# Patient Record
Sex: MALE | Race: WHITE
[De-identification: names, ages, dates, MRNs, and addresses within clinical notes are randomized per-mention and may not be internally consistent; named-entity substitution may affect disease eponyms.]

---

## 2018-04-12 ENCOUNTER — HOSPITAL ENCOUNTER (OUTPATIENT)
Age: 63
End: 2018-04-12
Payer: COMMERCIAL

## 2018-04-12 DIAGNOSIS — Z12.2: ICD-10-CM

## 2018-04-12 DIAGNOSIS — Z87.891: Primary | ICD-10-CM

## 2019-04-18 ENCOUNTER — HOSPITAL ENCOUNTER (OUTPATIENT)
Age: 64
End: 2019-04-18
Payer: MEDICARE

## 2019-04-18 DIAGNOSIS — Z12.2: Primary | ICD-10-CM

## 2019-04-18 DIAGNOSIS — Z87.891: ICD-10-CM

## 2019-05-03 ENCOUNTER — ON CAMPUS - OUTPATIENT (OUTPATIENT)
Dept: RURAL HOSPITAL 6 | Facility: HOSPITAL | Age: 64
End: 2019-05-03

## 2019-05-03 DIAGNOSIS — D12.3 BENIGN NEOPLASM OF TRANSVERSE COLON: ICD-10-CM

## 2019-05-03 DIAGNOSIS — D12.2 BENIGN NEOPLASM OF ASCENDING COLON: ICD-10-CM

## 2019-05-03 DIAGNOSIS — D12.5 BENIGN NEOPLASM OF SIGMOID COLON: ICD-10-CM

## 2019-05-03 DIAGNOSIS — D12.4 BENIGN NEOPLASM OF DESCENDING COLON: ICD-10-CM

## 2019-05-03 DIAGNOSIS — K64.0 FIRST DEGREE HEMORRHOIDS: ICD-10-CM

## 2019-05-03 DIAGNOSIS — K57.90 DIVERTICULOSIS OF INTESTINE, PART UNSPECIFIED, WITHOUT PERFO: ICD-10-CM

## 2019-05-03 DIAGNOSIS — Z12.11 ENCOUNTER FOR SCREENING FOR MALIGNANT NEOPLASM OF COLON: ICD-10-CM

## 2019-05-03 PROCEDURE — 45385 COLONOSCOPY W/LESION REMOVAL: CPT | Mod: PT | Performed by: INTERNAL MEDICINE

## 2021-10-26 ENCOUNTER — HOSPITAL ENCOUNTER (OUTPATIENT)
Age: 66
End: 2021-10-26
Payer: MEDICARE

## 2021-10-26 DIAGNOSIS — Z11.52: ICD-10-CM

## 2021-10-26 DIAGNOSIS — Z12.11: ICD-10-CM

## 2021-10-26 DIAGNOSIS — Z01.812: Primary | ICD-10-CM

## 2021-10-26 PROCEDURE — C9803 HOPD COVID-19 SPEC COLLECT: HCPCS

## 2021-10-26 PROCEDURE — U0005 INFEC AGEN DETEC AMPLI PROBE: HCPCS

## 2021-10-26 PROCEDURE — U0003 INFECTIOUS AGENT DETECTION BY NUCLEIC ACID (DNA OR RNA); SEVERE ACUTE RESPIRATORY SYNDROME CORONAVIRUS 2 (SARS-COV-2) (CORONAVIRUS DISEASE [COVID-19]), AMPLIFIED PROBE TECHNIQUE, MAKING USE OF HIGH THROUGHPUT TECHNOLOGIES AS DESCRIBED BY CMS-2020-01-R: HCPCS

## 2021-10-28 ENCOUNTER — HOSPITAL ENCOUNTER (OUTPATIENT)
Dept: HOSPITAL 22 - OUTP | Age: 66
Discharge: HOME | End: 2021-10-28
Payer: MEDICARE

## 2021-10-28 VITALS
OXYGEN SATURATION: 97 % | RESPIRATION RATE: 18 BRPM | HEART RATE: 65 BPM | TEMPERATURE: 97.4 F | SYSTOLIC BLOOD PRESSURE: 154 MMHG | DIASTOLIC BLOOD PRESSURE: 84 MMHG

## 2021-10-28 VITALS
HEART RATE: 57 BPM | DIASTOLIC BLOOD PRESSURE: 72 MMHG | RESPIRATION RATE: 18 BRPM | SYSTOLIC BLOOD PRESSURE: 111 MMHG | OXYGEN SATURATION: 95 %

## 2021-10-28 VITALS
SYSTOLIC BLOOD PRESSURE: 110 MMHG | DIASTOLIC BLOOD PRESSURE: 72 MMHG | OXYGEN SATURATION: 97 % | RESPIRATION RATE: 18 BRPM | HEART RATE: 57 BPM

## 2021-10-28 VITALS
DIASTOLIC BLOOD PRESSURE: 81 MMHG | OXYGEN SATURATION: 94 % | SYSTOLIC BLOOD PRESSURE: 115 MMHG | HEART RATE: 58 BPM | RESPIRATION RATE: 18 BRPM

## 2021-10-28 VITALS — OXYGEN SATURATION: 97 %

## 2021-10-28 VITALS
OXYGEN SATURATION: 94 % | SYSTOLIC BLOOD PRESSURE: 129 MMHG | DIASTOLIC BLOOD PRESSURE: 80 MMHG | HEART RATE: 62 BPM | RESPIRATION RATE: 18 BRPM

## 2021-10-28 VITALS
SYSTOLIC BLOOD PRESSURE: 118 MMHG | HEART RATE: 65 BPM | RESPIRATION RATE: 18 BRPM | OXYGEN SATURATION: 95 % | DIASTOLIC BLOOD PRESSURE: 74 MMHG

## 2021-10-28 VITALS
OXYGEN SATURATION: 93 % | SYSTOLIC BLOOD PRESSURE: 88 MMHG | DIASTOLIC BLOOD PRESSURE: 58 MMHG | HEART RATE: 67 BPM | RESPIRATION RATE: 18 BRPM | TEMPERATURE: 96.98 F

## 2021-10-28 VITALS
SYSTOLIC BLOOD PRESSURE: 125 MMHG | OXYGEN SATURATION: 95 % | RESPIRATION RATE: 18 BRPM | DIASTOLIC BLOOD PRESSURE: 63 MMHG | HEART RATE: 58 BPM

## 2021-10-28 VITALS — BODY MASS INDEX: 27.1 KG/M2

## 2021-10-28 DIAGNOSIS — K58.9: ICD-10-CM

## 2021-10-28 DIAGNOSIS — E78.5: ICD-10-CM

## 2021-10-28 DIAGNOSIS — K63.5: ICD-10-CM

## 2021-10-28 DIAGNOSIS — K56.2: ICD-10-CM

## 2021-10-28 DIAGNOSIS — Z72.0: ICD-10-CM

## 2021-10-28 DIAGNOSIS — K57.32: ICD-10-CM

## 2021-10-28 DIAGNOSIS — F12.90: ICD-10-CM

## 2021-10-28 DIAGNOSIS — Z86.010: ICD-10-CM

## 2021-10-28 DIAGNOSIS — J44.9: ICD-10-CM

## 2021-10-28 DIAGNOSIS — Z12.11: Primary | ICD-10-CM

## 2021-10-28 PROCEDURE — 88305 TISSUE EXAM BY PATHOLOGIST: CPT

## 2021-10-28 PROCEDURE — 74021 RADEX ABDOMEN 3+ VIEWS: CPT

## 2021-10-28 PROCEDURE — 45385 COLONOSCOPY W/LESION REMOVAL: CPT

## 2021-10-28 PROCEDURE — 0DJD8ZZ INSPECTION OF LOWER INTESTINAL TRACT, VIA NATURAL OR ARTIFICIAL OPENING ENDOSCOPIC: ICD-10-PCS | Performed by: SURGERY

## 2021-10-28 NOTE — SUR.PHASEII
repeated discharge instructions and to come to ER if develops abdominal pain or vominting blood, verbalized understanding.

## 2021-10-28 NOTE — SUR.PHASEII
Dr. Christensen says send pt home and have pt return tomorrow for another flat and upright. instructed pt of this and he verbalized understanding.

## 2021-10-28 NOTE — SUR.PHASEII
Xray results back, impression: possibility in RUQ for a tiny amount of free air. Dr Christensen notified. Waiting for response.

## 2021-10-29 ENCOUNTER — HOSPITAL ENCOUNTER (OUTPATIENT)
Age: 66
End: 2021-10-29
Payer: MEDICARE

## 2021-10-29 DIAGNOSIS — Z86.010: Primary | ICD-10-CM

## 2021-10-29 PROCEDURE — 74019 RADEX ABDOMEN 2 VIEWS: CPT

## 2022-03-28 ENCOUNTER — HOSPITAL ENCOUNTER (OUTPATIENT)
Age: 67
End: 2022-03-28
Payer: MEDICARE

## 2022-03-28 DIAGNOSIS — Z87.891: Primary | ICD-10-CM

## 2022-03-28 DIAGNOSIS — Z12.2: ICD-10-CM

## 2022-03-28 PROCEDURE — 71271 CT THORAX LUNG CANCER SCR C-: CPT

## 2023-10-06 ENCOUNTER — HOSPITAL ENCOUNTER (OUTPATIENT)
Age: 68
End: 2023-10-06
Payer: MEDICARE

## 2023-10-06 DIAGNOSIS — Z12.2: ICD-10-CM

## 2023-10-06 DIAGNOSIS — Z87.891: Primary | ICD-10-CM

## 2023-10-06 PROCEDURE — 71271 CT THORAX LUNG CANCER SCR C-: CPT

## 2024-10-04 ENCOUNTER — HOSPITAL ENCOUNTER (OUTPATIENT)
Dept: HOSPITAL 22 - RAD | Age: 69
Discharge: HOME | End: 2024-10-04
Payer: MEDICARE

## 2024-10-04 DIAGNOSIS — Z87.891: Primary | ICD-10-CM

## 2024-10-04 PROCEDURE — 71271 CT THORAX LUNG CANCER SCR C-: CPT

## 2024-10-04 NOTE — CT_ITS
FINAL REPORT 
 
TECHNIQUE: 
Axial images were obtained from the lung apex to the mid abdomen 
by computed tomography.  This study was performed with 
techniques to keep radiation doses as low as reasonably 
achievable (ALARA). Individualized dose reduction techniques 
using automated exposure control or adjustment of mA and/or kV 
according to the patient's size were employed. 
 
CLINICAL HISTORY: 
SCREENING, current smoker, 1ppd 55 years 
 
COMPARISON: 
10/06/2023 
 
FINDINGS: 
CHEST CT LOW DOSE  CTDI vol (mGy):  2.90  DLP (mGy-cm):  111.77 
There is no axillary adenopathy.  There is no hilar or 
mediastinal adenopathy.  There is moderate coronary artery 
calcification.  The heart is normal in size.  There is no 
pericardial or pleural effusion.  There are advanced changes of 
centrilobular emphysema.  No suspicious mass or nodule is 
identified.  Limited images of the upper abdomen demonstrate a 
benign-appearing cyst in the liver measuring to 4.6 cm in 
diameter.  The gallbladder is surgically absent. 
 
IMPRESSION: 
Advanced changes of centrilobular emphysema.    Benign cyst in 
the liver.  Lung RADS category 1S.  Recommend 12 month follow-up 
low-dose chest CT. 
 
Reviewed, Interpreted and Dictated by Mark Steward MD 
Transcribed by Ne Nguyen 
 
Authenticated and Electronically Signed by Mark Steward MD 
on 10/04/2024 11:44:17 AM [facility]

## 2025-01-03 ENCOUNTER — HOSPITAL ENCOUNTER (OUTPATIENT)
Dept: HOSPITAL 22 - ER | Age: 70
Setting detail: OBSERVATION
LOS: 1 days | Discharge: HOME | End: 2025-01-04
Payer: MEDICARE

## 2025-01-03 VITALS
DIASTOLIC BLOOD PRESSURE: 77 MMHG | OXYGEN SATURATION: 93 % | TEMPERATURE: 97.7 F | HEART RATE: 53 BPM | SYSTOLIC BLOOD PRESSURE: 146 MMHG | RESPIRATION RATE: 16 BRPM

## 2025-01-03 VITALS — DIASTOLIC BLOOD PRESSURE: 90 MMHG | HEART RATE: 60 BPM | SYSTOLIC BLOOD PRESSURE: 162 MMHG | OXYGEN SATURATION: 98 %

## 2025-01-03 VITALS
SYSTOLIC BLOOD PRESSURE: 181 MMHG | RESPIRATION RATE: 20 BRPM | TEMPERATURE: 98.06 F | OXYGEN SATURATION: 99 % | HEART RATE: 59 BPM | DIASTOLIC BLOOD PRESSURE: 93 MMHG

## 2025-01-03 VITALS
HEART RATE: 75 BPM | TEMPERATURE: 98.24 F | SYSTOLIC BLOOD PRESSURE: 152 MMHG | OXYGEN SATURATION: 96 % | RESPIRATION RATE: 16 BRPM | DIASTOLIC BLOOD PRESSURE: 91 MMHG

## 2025-01-03 VITALS — BODY MASS INDEX: 25.2 KG/M2 | BODY MASS INDEX: 26.4 KG/M2

## 2025-01-03 VITALS
TEMPERATURE: 98 F | OXYGEN SATURATION: 96 % | SYSTOLIC BLOOD PRESSURE: 145 MMHG | RESPIRATION RATE: 16 BRPM | HEART RATE: 53 BPM | DIASTOLIC BLOOD PRESSURE: 60 MMHG

## 2025-01-03 DIAGNOSIS — F17.210: ICD-10-CM

## 2025-01-03 DIAGNOSIS — E78.5: ICD-10-CM

## 2025-01-03 DIAGNOSIS — Z79.899: ICD-10-CM

## 2025-01-03 DIAGNOSIS — M62.81: ICD-10-CM

## 2025-01-03 DIAGNOSIS — J44.9: ICD-10-CM

## 2025-01-03 DIAGNOSIS — I63.89: Primary | ICD-10-CM

## 2025-01-03 DIAGNOSIS — R29.703: ICD-10-CM

## 2025-01-03 DIAGNOSIS — G81.91: ICD-10-CM

## 2025-01-03 DIAGNOSIS — K21.9: ICD-10-CM

## 2025-01-03 LAB
ALBUMIN LEVEL: 4.4 G/DL (ref 3.5–5)
ALBUMIN/GLOB SERPL: 1.6 {RATIO} (ref 1.1–1.8)
ALP ISO SERPL-ACNC: 67 U/L (ref 38–126)
ALT SERPLBLD-CCNC: 22 U/L (ref 12–78)
ANION GAP SERPL CALC-SCNC: 15.5 MEQ/L (ref 5–15)
AST SERPL QL: 28 U/L (ref 17–59)
BILIRUBIN,TOTAL: 0.9 MG/DL (ref 0.2–1.3)
BUN SERPL-MCNC: 8 MG/DL (ref 9–20)
CALCIUM SPEC-MCNC: 9.7 MG/DL (ref 8.4–10.2)
CHLORIDE SPEC-SCNC: 104 MMOL/L (ref 98–107)
CHOLEST SPEC-SCNC: 229 MG/DL (ref 140–200)
CO2 SERPL-SCNC: 26 MMOL/L (ref 22–30)
CREAT BLD-SCNC: 0.8 MG/DL (ref 0.66–1.25)
CREATININE CLEARANCE ESTIMATED: 80 ML/MIN (ref 50–200)
ESTIMATED GLOMERULAR FILT RATE: 95 ML/MIN (ref 60–?)
GFR (AFRICAN AMERICAN): 115 ML/MIN (ref 60–?)
GLOBULIN SER CALC-MCNC: 2.8 G/DL (ref 1.3–3.2)
GLUCOSE: 102 MG/DL (ref 74–100)
HBA1C MFR BLD: 5.2 % (ref 4–6)
HCT VFR BLD CALC: 49.3 % (ref 42–52)
HDLC SERPL-MCNC: 46 MG/DL (ref 40–60)
HGB BLD-MCNC: 16.7 G/DL (ref 14.1–18)
LIPASE: 57 U/L (ref 23–300)
MCHC RBC-ENTMCNC: 33.9 G/DL (ref 31.8–35.4)
MCV RBC: 91 FL (ref 80–94)
MEAN CORPUSCULAR HEMOGLOBIN: 30.8 PG (ref 27–31.2)
NT PRO BRAIN NATRIURETIC PEP.: 112 PG/ML (ref 0–125)
PLATELET # BLD: 198 K/MM3 (ref 142–424)
POTASSIUM: 4.1 MMOL/L (ref 3.5–5.1)
POTASSIUM: 7.5 MMOL/L (ref 3.5–5.1)
PROT SERPL-MCNC: 7.2 G/DL (ref 6.3–8.2)
RBC # BLD AUTO: 5.42 M/MM3 (ref 4.6–6.2)
SODIUM SPEC-SCNC: 138 MMOL/L (ref 136–145)
TRIGLYCERIDES: 131 MG/DL (ref 30–150)
TROPONIN I: < 0.01 NG/ML (ref 0–0.03)
WBC # BLD AUTO: 5.7 K/MM3 (ref 4.8–10.8)

## 2025-01-03 PROCEDURE — G0378 HOSPITAL OBSERVATION PER HR: HCPCS

## 2025-01-03 PROCEDURE — 84484 ASSAY OF TROPONIN QUANT: CPT

## 2025-01-03 PROCEDURE — 70498 CT ANGIOGRAPHY NECK: CPT

## 2025-01-03 PROCEDURE — 93005 ELECTROCARDIOGRAM TRACING: CPT

## 2025-01-03 PROCEDURE — 80053 COMPREHEN METABOLIC PANEL: CPT

## 2025-01-03 PROCEDURE — 80061 LIPID PANEL: CPT

## 2025-01-03 PROCEDURE — 85025 COMPLETE CBC W/AUTO DIFF WBC: CPT

## 2025-01-03 PROCEDURE — 71045 X-RAY EXAM CHEST 1 VIEW: CPT

## 2025-01-03 PROCEDURE — 70450 CT HEAD/BRAIN W/O DYE: CPT

## 2025-01-03 PROCEDURE — 83690 ASSAY OF LIPASE: CPT

## 2025-01-03 PROCEDURE — 84132 ASSAY OF SERUM POTASSIUM: CPT

## 2025-01-03 PROCEDURE — 83880 ASSAY OF NATRIURETIC PEPTIDE: CPT

## 2025-01-03 PROCEDURE — 70496 CT ANGIOGRAPHY HEAD: CPT

## 2025-01-03 PROCEDURE — 99285 EMERGENCY DEPT VISIT HI MDM: CPT

## 2025-01-03 PROCEDURE — 70551 MRI BRAIN STEM W/O DYE: CPT

## 2025-01-03 PROCEDURE — 83036 HEMOGLOBIN GLYCOSYLATED A1C: CPT

## 2025-01-03 PROCEDURE — 97162 PT EVAL MOD COMPLEX 30 MIN: CPT

## 2025-01-03 RX ADMIN — SODIUM CHLORIDE, PRESERVATIVE FREE 10 ML: 5 INJECTION INTRAVENOUS at 16:04

## 2025-01-03 RX ADMIN — IOPAMIDOL 80 ML: 755 INJECTION, SOLUTION INTRAVENOUS at 16:05

## 2025-01-03 RX ADMIN — SODIUM CHLORIDE 50 ML: 9 INJECTION, SOLUTION INTRAMUSCULAR; INTRAVENOUS; SUBCUTANEOUS at 16:05

## 2025-01-03 RX ADMIN — ASPIRIN 324 MG: 81 TABLET, CHEWABLE ORAL at 16:30

## 2025-01-03 RX ADMIN — SODIUM CHLORIDE, SODIUM LACTATE, POTASSIUM CHLORIDE, AND CALCIUM CHLORIDE 999 ML: 600; 310; 30; 20 INJECTION, SOLUTION INTRAVENOUS at 16:30

## 2025-01-03 RX ADMIN — LISINOPRIL 5 MG: 5 TABLET ORAL at 17:46

## 2025-01-03 NOTE — XR_ITS
FINAL REPORT 
 
CLINICAL HISTORY: 
stroke like symptoms 
 
COMPARISON: 
None 
 
FINDINGS: 
A portable view of the chest was obtained.  Cardiac and 
mediastinal silhouettes are within normal limits.  There are 
mild increased interstitial markings bilaterally, likely 
chronic.  There is no pleural effusion or pneumothorax. 
 
IMPRESSION: 
No acute process on this portable exam. 
 
Reviewed, Interpreted and Dictated by Gloria Esquivel MD 
Transcribed by Sapna Batista 
 
Authenticated and Electronically Signed by Gloria Esquivel MD on 
01/03/2025 04:51:04 PM [facility]

## 2025-01-03 NOTE — ECG_ITS
--------------- APPROVED REPORT -------------- 
 
 
Exam: Resting ECG 
 
HR:58 bpm          
 
ECG Measurements 
Heart Rate               58                   AXES                     
 
WV                       162                     P                     
    46                      
QRSd                         83                   QRS                  
     37                      
QT                       423                    T    35                
 
QTc                      419                           
 
Conclusion 
SINUS BRADYCARDIA 
MINIMAL ST DEPRESSION  [0.025+ mV ST DEPRESSION] 
BORDERLINE ECG 
UNCONFIRMED REPORT 
 
 
Electronically signed by : MAXINE SIERRA,  01/05/2025 02:36:45

## 2025-01-03 NOTE — PC.NURSE
Mazin Parkinson PAC s/w [facility] with MRI findings. These results were also discussed with Dr. Sullivan. I updated Dariana Sotomayor RN

## 2025-01-03 NOTE — MR_ITS
PROCEDURE INFORMATION:  
Exam: MR Head Without Contrast  
Exam date and time: 1/3/2025 5:49 PM  
Age: 69 years old  
Clinical indication: Stroke-like symptoms; RT upper extremity and RT  
lower  
extremity weakness; Additional info: R sided weakness [id number]  
 
TECHNIQUE:  
Imaging protocol: Magnetic resonance imaging of the head without  
contrast.  
 
COMPARISON:  
CT ANGIO HEAD 1/3/2025 4:02 PM  
 
FINDINGS:  
 Major vascular flow voids at the skull base are preserved. No  
extra-axial  
fluid collection. No obstructive hydrocephalus.  
 Mild age-related volume loss. Diffusion restriction at the left  
basal ganglia  
extending to the left corona radiata measuring up to 1.2 cm.  
Associated T2  
prolongation.  
 Moderate paranasal sinus disease. Small bilateral mastoid effusions.  
 
IMPRESSION:  
1.2 cm acute/early subacute infarct at the left basal ganglia  
extending to the  
corona radiata.  
 
THIS REPORT CONTAINS FINDINGS THAT MAY BE CRITICAL TO PATIENT CARE.  
The  
findings were verbally communicated via telephone conference with ELENI Celestin at 6:11 PM EST on 1/3/2025. The findings were acknowledged  
and  
understood. 
 
Electronically signed by Patricio Castillo MD at 01/03/2025 18:12

## 2025-01-03 NOTE — CT_ITS
FINAL REPORT 
 
TECHNIQUE: 
Thin-section axial CT with IV contrast supplemented with multi 
planar reconstruction under CT angiogram protocol was performed 
of the neck.  This study was performed technique to keep 
radiation doses as low as reasonably achievable, (ALARA). 
NASCET criteria was utilized during interpretation. 
 
CLINICAL HISTORY: 
R sided UE and LE linhkness 12h, stroke alert 
 
COMPARISON: 
None 
 
FINDINGS: 
Aortic arch:  Arch shows no significant narrowing.  Great vessel 
origins are widely patent.  Right carotid: No significant 
stenosis is seen at the cervical common or internal carotid 
artery.  This is considered 0% stenosis by NASCET criteria. 
Left carotid:  No significant stenosis is seen at the cervical 
common or internal carotid artery.  A small amount of calcified 
plaque is noted in the left carotid bulb, however no significant 
stenosis is identified, consistent with 0% stenosis by NASCET 
criteria.  Vertebrals:  No significant stenosis is present. 
 
IMPRESSION: 
No evidence of significant stenosis or major branch occlusion. 
 
Reviewed, Interpreted and Dictated by Gloria Esquivel MD 
Transcribed by Sapna Batista 
 
Authenticated and Electronically Signed by Gloria Esquivel MD on 
01/03/2025 04:49:16 PM [facility]

## 2025-01-03 NOTE — CT_ITS
FINAL REPORT 
 
TECHNIQUE: 
Thin section axial images are obtained through the brain after 
intravenous contrast injection.  Multiplanar reconstructions 
were obtained from the axial data.  Exam was performed using 
dose reduction technique per the ALARA principal. 
 
CLINICAL HISTORY: 
R sided UE and LE linhkness 12h, stroke alert 
 
COMPARISON: 
None 
 
FINDINGS: 
The intracerebral portions of the carotid arteries are patent. 
The anterior and middle cerebral arteries are patent.  The 
basilar artery is patent.  The posterior cerebral arteries arise 
from the basilar artery.  [place] is intact.  There is 
no significant stenosis, aneurysm, or AVM. 
 
IMPRESSION: 
Unremarkable CT angiogram of the intracerebral vasculature. 
 
Reviewed, Interpreted and Dictated by Gloria Esquivel MD 
Transcribed by Sapna Batista 
 
Authenticated and Electronically Signed by Gloria Esquivel MD on 
01/03/2025 04:49:12 PM [facility]

## 2025-01-03 NOTE — ED_ITS
Discharge Plan    
Disposition    
Patient Disposition: Admitted    
    
Chief Complaint: Neuro Symptoms/Deficit    
    
Clinical Impressions    
Clinical Impression:    
 Acute right-sided muscle weakness    
    
    
Discharge    
ED Provider: Vladislav Sullivan    
    
    
General Adult HPI    
General    
Chief complaint: Neuro Symptoms/Deficit    
Stated complaint: poss. stroke    
Time Seen by Provider: 01/03/25 15:50    
Mode of Arrival: Ambulatory    
Source of Information: Patient    
Limitations: No Limitations    
Description of Symptoms (Recalled from ER Triage Doc. by RN): pt to ed c/o   
stroke like symptoms. pt LKN: 2300 yesterday.     
reports he was playing dominos, became cold, started having left sided weakness   
and had a fall today.      
    
History of Present Illness    
HPI narrative:     
    
    
Please note that above description of symptoms, in this electronic medical   
record under categorization of  recalled from ER triage doctor by RN  are   
reflective of an initial nursing assessment, however, is not reflective of my   
full history and physical exam that was personally taken and clarified.    
Consequentially, this preceding description of symptoms, which may include the   
patient's categorized chief complaint in the EMR, do not reflect my personal   
clinical impression, and the ultimate description of history of present illness   
and patient stated complaints should be deferred to this section of the note.    
Unless stated otherwise or congruent with this section of the note, additional   
signs, symptoms, or incongruence should be interpreted as inaccurate with my   
clinical impression.    
Related Data    
                                Home Medications    
    
    
    
?Medication ?Instructions ?Recorded ?Confirmed    
     
albuterol sulfate 90 mcg/actuation 2 puffs IH DAILY COPD 04/30/19 11/03/21    
    
aerosol inhaler       
     
fluoxetine 90 mg capsule,delayed 90 mg PO DAILY Depression 04/30/19 11/03/21    
    
release       
     
fluticasone propionate 220 12 gm IH DAILY COPD 04/30/19 11/03/21    
    
mcg/actuation HFA aerosol inhaler       
     
atorvastatin 10 mg tablet 10 mg PO HS Cholesterol 10/25/21 11/03/21    
    
    
    
                                    Allergies    
    
    
    
Allergy/AdvReac Type Severity Reaction Status Date / Time    
     
Penicillins Allergy   Verified 11/03/21 14:58    
    
    
    
    
[facility]    
Disclaimer:     
The information contained in this section may have been updated after the   
patient was seen, as this information can be updated by other users.    
    
Social History    
Smoking Status:  Current every day smoker tobacco type: cigarettes packs per   
day: 1     
alcohol intake:  current alcohol intake frequency: a few times a month     
substance use type:  marijuana     
current occupational status:  retired     
Travel in the last 8 weeks:  None     
caffeine:  Yes     
Have you lived/traveled outside [place] in past 30 days?:  No     
Contact w/someone who lives/traveled outside US past 30 days?:  No     
Exposure to someone with infectious disease in past 14 days?:  No     
Do you have a fever (greater than 100.4 F or 38 C)?:  No     
Have you tested positive for COVID-19:  No     
Exposed to someone with COVID-19 in past 14 days?:  No     
Do you have a sore throat?:  No     
Do you have a cough?:  No     
Do you have any weakness?:  No     
Do you have any diarrhea?:  No     
Are you experiencing any unusual bleeding?:  No     
Do you have any muscle aches/pain?:  No     
Do you have any abdominal pain?:  No     
Are you experiencing loss of taste or smell?:  No     
    
    
Other Medical History    
Have you received the Flu Vaccine for this season: Yes    
Have you received the Pneumonia Vaccine: Yes    
    
ROS Obtained: Yes All systems reviewed & no additional complaints except as   
documented    
    
Physical Exam    
General    
General appearance: alert and in no apparent distress    
Head    
Head exam: atraumatic and normocephalic    
Eye    
Eye exam: Present normal appearance, PERRL and EOMI    
Neck    
Neck exam: Present normal inspection, full ROM and trachea midline    
Respiratory    
Respiratory exam: Present normal lung sounds bilaterally; Absent respiratory   
distress, wheezes, stridor, accessory muscle use or prolonged expiratory phase    
Cardiovascular    
Cardiovascular exam: Present regular rate, normal rhythm and other (Pulses equal  
symmetric in upper and lower extremities)    
Abdominal Exam    
Abdominal exam: Present soft; Absent distention, tenderness or pulsatile mass    
Extremities Exam    
Extremities exam: Absent edema    
Neurological Exam    
Neurological exam: Present alert, oriented X3, CN II-XII intact and motor   
sensory deficit (Weakness and after right upper and right lower extremity with 4  
out of 5 strength.  Dysmetria right upper extremity.  NIHSS 3)    
Skin    
Skin exam: Present warm and dry; Absent diaphoresis or erythema    
    
Medical Decision Making    
Medical Records    
Medical records reviewed: Yes I reviewed the patient's medical records.    
Screening:     
Per USPSTF and CDC recommendations, given the prevalence of disease in our   
region, it is our hospital?s policy to screen for HIV and viral Hepatitis for   
all patients aged 18 and over and those with ongoing risk factors.     
    
Parth Inquiry    
Pt receiving controlled substance: No    
Parth was queried for this patient: No    
Vital Signs:     
    
                                            
    
    
    
 01/03/25    
15:54 01/03/25    
16:15    
     
Temperature 98.1 F     
     
Temperature Source Oral     
     
Pulse Rate  60    
     
Pulse Rate [Left Radial] 59 L     
     
Respiratory Rate 20     
     
Blood Pressure  162/90 H    
     
Blood Pressure [Right Arm] 181/93 H     
     
Blood Pressure Mean  114    
     
Blood Pressure Mean [Right Arm] 122     
     
02 Sat by Pulse Oximetry 99 98    
     
Oxygen Delivery Method Room Air Room Air    
    
    
    
    
Lab Data    
    
                                   Lab Results    
    
01/03/25 16:00: WBC 5.7, RBC 5.42, Hgb 16.7, Hct 49.3, MCV 91.0, MCH 30.8, MCHC   
33.9, RDW 12.4, Plt Count 198, MPV 10.2, Neut % (Auto) 57.6, Lymph % (Auto)   
32.3, Mono % (Auto) 7.2, Eos % (Auto) 1.6, Baso % (Auto) 1.1, Neut # (Auto) 3.3,  
Lymph # (Auto) 1.8, Mono # (Auto) 0.4, Eos # (Auto) 0.1, Baso # (Auto) 0.1,   
Sodium 138, Potassium 7.5 H*, Chloride 104, Carbon Dioxide 26, Anion Gap 15.5 H,  
BUN 8 L, Creatinine 0.80, Estimated Creat Clear 80, Estimated GFR 95, Est GFR   
(African Amer) 115, Glucose 102 H, Hemoglobin A1c 5.2, Calcium 9.7, Total   
Bilirubin 0.9, AST 28, ALT 22, Alkaline Phosphatase 67, Troponin I < 0.01,   
NT-Pro-B Natriuret Pep 112, Total Protein 7.2, Albumin 4.4, Globulin 2.8,   
Albumin/Globulin Ratio 1.6, Triglycerides 131, Cholesterol 229 H, LDL   
Cholesterol Direct 147.99 H, VLDL Cholesterol 26, HDL Cholesterol 46, C  
holesterol/HDL Ratio 5.0 H, Lipase 57    
01/03/25 16:30: Potassium 4.1  D    
    
    
    
    
                                                        01/03/25 16:00              
    
                                                        01/03/25 16:30              
    
Orders (Tests/Meds):     
    
                                 ED MEDICATIONS    
    
    
    
Generic Name Dose Route Start Last Admin    
    
  Trade Name Freq  PRN Reason Stop Dose Admin    
     
Calcium Gluconate/Sodium Chloride  2 gm in 100 mls @ 50 mls/hr  01/03/25 16:29    
01/03/25 17:25    
    
  Calcium Gluconate 2,000mg/100ml Nacl Premix  IV  01/03/25 18:28  Not Given    
    
  ONCE ONE      
    
    
    
    
Discontinued Medications    
    
    
    
    
Generic Name Dose Route Start Last Admin    
    
  Trade Name Freq  PRN Reason Stop Dose Admin    
     
Aspirin  324 mg  01/03/25 15:50  01/03/25 16:30    
    
  Aspirin 81mg Chewable Tablet  PO  01/03/25 15:51  324 mg    
    
  ONCE ONE   Administration    
     
Dextrose  50 ml  01/03/25 16:29  01/03/25 17:25    
    
  Dextrose 50% 50ml Syringe (Crash Cart)  IVP  01/03/25 16:30  Not Given    
    
  ONCE ONE      
     
Lactated Ringer's  1,000 mls @ 999 mls/hr  01/03/25 15:50  01/03/25 16:30    
    
  Lactated Ringer's 1000 Ml Bag  IV  01/03/25 16:50  999 mls/hr    
    
  .Q1H1M ONE   Administration    
     
Insulin Human Regular  10 unit  01/03/25 16:29  01/03/25 17:25    
    
  Insulin Human Regular 100 Units/Ml 10ml Vial  IV  01/03/25 16:30  Not Given    
    
  ONCE ONE      
     
Iopamidol  80 ml  01/03/25 16:03  01/03/25 16:05    
    
  Iopamidol-370 (76%);100ml Bottle  IV  01/03/25 16:04  80 ml    
    
  ONCE ONE   Administration    
     
Lisinopril  5 mg  01/03/25 17:17     
    
  Lisinopril 5mg Tablet  PO  01/03/25 17:18     
    
  ONCE ONE      
     
Sodium Chloride  10 ml  01/03/25 16:03  01/03/25 16:04    
    
  Sodium Chloride 0.9% 10ml Syr (Rad Only)  IV  01/03/25 16:04  10 ml    
    
  ONCE ONE   Administration    
     
Sodium Chloride  50 ml  01/03/25 16:03  01/03/25 16:05    
    
  0.9 % Sodium Chloride 50 Ml Vial  IV  01/03/25 16:04  50 ml    
    
  ONCE ONE   Administration    
     
Sodium Zirconium Cyclosilicate  10 gm  01/03/25 16:29  01/03/25 17:25    
    
  Lokelma 5gm Packet  PO  01/03/25 16:30  Not Given    
    
  ONCE ONE      
    
    
                                     ORDERS    
    
    
    
 Category Date Time Status    
     
 CT angio head Stat Cat Scan  01/03/25 15:50 Completed    
     
 CT angio neck Stat Cat Scan  01/03/25 15:50 Completed    
     
 CT head/brain wo con Stat Cat Scan  01/03/25 15:50 Completed    
     
 CXR --portable [XR chest portable] Stat Exams  01/03/25 15:50 Completed    
     
 CBC w/Auto Diff [Complete Blood Count Auto Diff] Stat Lab  01/03/25 16:00   
Completed    
     
 CMP [Comprehensive Metabolic Panel] Stat Lab  01/03/25 16:00 Completed    
     
 Hemoglobin A1C Stat Lab  01/03/25 16:00 Completed    
     
 Lipase Stat Lab  01/03/25 16:00 Completed    
     
 Lipid Panel Stat Lab  01/03/25 16:00 Completed    
     
 NT Pro Brain Natriuretic Pep. Stat Lab  01/03/25 16:00 Completed    
     
 Potassium Stat Lab  01/03/25 16:30 Completed    
     
 Trop I [Troponin I] Stat Lab  01/03/25 16:00 Completed    
     
 Troponin I Q3H Lab  01/03/25 19:00 Ordered    
     
 Troponin I Q3H Lab  01/03/25 22:00 Ordered    
     
 CA echo doppler complete AM Y  01/04/25 07:00 Ordered    
    
    
    
    
Medical Decision Narrative:     
69-year-old male history of COPD and tobacco use disorder still currently   
smoking 1.5 packs/day and has been doing so for nearly 60 years presenting with   
right-sided weakness.  Patient states that he has intermittently had weakness on  
the right side over the past couple of months, but last night, 1/2 in the PM   
noticed that his right upper extremity was heavy and weak as well as his right   
lower extremity.  States that he was having difficulty picking his right lower   
extremity off the floor when he was walking and has fallen toward his right   
lower extremity a couple of times.  Has not hit his head.  No anticoagulation.    
No chest pain, nausea, vomiting, left-sided deficits, speech deficits, vision   
changes, or any other concerns.  Never had anything like this in the past.   
History was obtained via conversation with patient. On arrival, patient   
hemodynamically stable, alert, oriented x4, appropriate, GCS 15, moving all   
extremities spontaneously, pupils equal and reactive to light.  Full physical   
exam performed and significant for Weakness and after right upper and right   
lower extremity with 4 out of 5 strength.  Dysmetria right upper extremity.    
NIHSS 3.  Cardiopulmonary exam normal.  Cranial nerves intact and symmetric.   
Differential includes hemorrhagic CVA, embolic CVA, critical cervical or   
intracranial stenosis, intracranial mass, TIA, among others.      
    
Patient placed on continuous cardiac monitoring and continuous pulse ox with   
initial blood pressure 191/93, heart rate 59, saturation 99% on room air.    
Independent interpretation of EKG shows sinus bradycardia 58 bpm.  , QRS   
83, QTc 419.  No acute ischemic change.  Normal axis. Patient was given full   
dose aspirin 324 mg for symptomatic management and correction of underlying   
abnormalities.  Workup independently interpreted and significant for no acute   
intracranial hemorrhage on CT head.  Labs independently interpreted with   
nonactionable chemistry other than significantly elevated potassium greater than  
7.  I feel this is likely a lab error given normal kidney function and otherwise  
normal labs.  Insulin, dextrose, calcium gluconate were ordered, but held prior   
to repeat potassium being drawn given no clinical symptoms and no EKG changes.    
Repeat potassium 4.1.  Hyperkalemic protocol was canceled.  Nonactionable lipid   
panel.  CBC normal. On independent interpretation of angiograms, no acute   
intracranial hemorrhage.  Patient does have chronic findings in the deep matter   
of his brain.  See radiology read for full review of final results.  Heart score  
3. On reevaluation, patient still symptomatic with NIHSS 3.  I feel this is   
likely representative of TIA versus basal ganglia stroke.  Patient's primary   
care provider team was contacted and case was discussed for admission.    
Recommended lisinopril for pressures and admission.  Formal echo was also   
ordered for the morning.  MR head was also ordered.  Given patient presentation,  
workup, history, this most likely represents strokelike symptoms.  Because   
patient high risk for clinical decompensation, deemed appropriate for inpatient   
admission.  Results were relayed to patient who voiced understanding and patient  
was agreeable to inpatient admission and management.  Patient was admitted to   
the hospital for further definitive management.    
    
Transcription disclaimer    
Much of this encounter note is an electronic transcription spoken language to   
printed text.  Electronic transcription of the spoken language may permit   
errors.  Although I have reviewed the note, some errors may still exist.    
    
Critical Care    
Critical Care Time    
Critical Care Time: No

## 2025-01-03 NOTE — [facility]EDGENADL
Discharge Plan
Disposition
Patient Disposition: Admitted

Chief Complaint: Neuro Symptoms/Deficit

Clinical Impressions
Clinical Impression:
 Acute right-sided muscle weakness


Discharge
ED Provider: Vladislav Sullivan


General Adult HPI
General
Chief complaint: Neuro Symptoms/Deficit
Stated complaint: poss. stroke
Time Seen by Provider: 01/03/25 15:50
Mode of Arrival: Ambulatory
Source of Information: Patient
Limitations: No Limitations
Description of Symptoms (Recalled from ER Triage Doc. by RN): pt to ed c/o stroke like symptoms. pt LKN: 2300 yesterday. 
reports he was playing dominos, became cold, started having left sided weakness and had a fall today.  

History of Present Illness
HPI narrative: 


Please note that above description of symptoms, in this electronic medical record under categorization of  recalled from ER triage doctor by RN  are reflective of an initial nursing assessment, however, is not reflective of my full history and 
physical exam that was personally taken and clarified.  Consequentially, this preceding description of symptoms, which may include the patient's categorized chief complaint in the EMR, do not reflect my personal clinical impression, and the ultimate 
description of history of present illness and patient stated complaints should be deferred to this section of the note.  Unless stated otherwise or congruent with this section of the note, additional signs, symptoms, or incongruence should be 
interpreted as inaccurate with my clinical impression.
Related Data
Home Medications

?Medication ?Instructions ?Recorded ?Confirmed
albuterol sulfate 90 mcg/actuation 2 puffs IH DAILY COPD 04/30/19 11/03/21
aerosol inhaler   
fluoxetine 90 mg capsule,delayed 90 mg PO DAILY Depression 04/30/19 11/03/21
release   
fluticasone propionate 220 12 gm IH DAILY COPD 04/30/19 11/03/21
mcg/actuation HFA aerosol inhaler   
atorvastatin 10 mg tablet 10 mg PO HS Cholesterol 10/25/21 11/03/21


Allergies

Allergy/AdvReac Type Severity Reaction Status Date / Time
Penicillins Allergy   Verified 11/03/21 14:58



[facility]
Disclaimer: 
The information contained in this section may have been updated after the patient was seen, as this information can be updated by other users.

Social History
Smoking Status:  Current every day smoker tobacco type: cigarettes packs per day: 1 
alcohol intake:  current alcohol intake frequency: a few times a month 
substance use type:  marijuana 
current occupational status:  retired 
Travel in the last 8 weeks:  None 
caffeine:  Yes 
Have you lived/traveled outside [place] in past 30 days?:  No 
Contact w/someone who lives/traveled outside [place] past 30 days?:  No 
Exposure to someone with infectious disease in past 14 days?:  No 
Do you have a fever (greater than 100.4 F or 38 C)?:  No 
Have you tested positive for COVID-19:  No 
Exposed to someone with COVID-19 in past 14 days?:  No 
Do you have a sore throat?:  No 
Do you have a cough?:  No 
Do you have any weakness?:  No 
Do you have any diarrhea?:  No 
Are you experiencing any unusual bleeding?:  No 
Do you have any muscle aches/pain?:  No 
Do you have any abdominal pain?:  No 
Are you experiencing loss of taste or smell?:  No 


Other Medical History
Have you received the Flu Vaccine for this season: Yes
Have you received the Pneumonia Vaccine: Yes

ROS Obtained: Yes All systems reviewed & no additional complaints except as documented

Physical Exam
General
General appearance: alert and in no apparent distress
Head
Head exam: atraumatic and normocephalic
Eye
Eye exam: Present normal appearance, PERRL and EOMI
Neck
Neck exam: Present normal inspection, full ROM and trachea midline
Respiratory
Respiratory exam: Present normal lung sounds bilaterally; Absent respiratory distress, wheezes, stridor, accessory muscle use or prolonged expiratory phase
Cardiovascular
Cardiovascular exam: Present regular rate, normal rhythm and other (Pulses equal symmetric in upper and lower extremities)
Abdominal Exam
Abdominal exam: Present soft; Absent distention, tenderness or pulsatile mass
Extremities Exam
Extremities exam: Absent edema
Neurological Exam
Neurological exam: Present alert, oriented X3, CN II-XII intact and motor sensory deficit (Weakness and after right upper and right lower extremity with 4 out of 5 strength.  Dysmetria right upper extremity.  NIHSS 3)
Skin
Skin exam: Present warm and dry; Absent diaphoresis or erythema

Medical Decision Making
Medical Records
Medical records reviewed: Yes I reviewed the patient's medical records.
Screening: 
Per USPSTF and CDC recommendations, given the prevalence of disease in our region, it is our hospital?s policy to screen for HIV and viral Hepatitis for all patients aged 18 and over and those with ongoing risk factors. 

Parth Inquiry
Pt receiving controlled substance: No
Parth was queried for this patient: No
Vital Signs: 



 01/03/25
15:54 01/03/25
16:15
Temperature 98.1 F 
Temperature Source Oral 
Pulse Rate  60
Pulse Rate [Left Radial] 59 L 
Respiratory Rate 20 
Blood Pressure  162/90 H
Blood Pressure [Right Arm] 181/93 H 
Blood Pressure Mean  114
Blood Pressure Mean [Right Arm] 122 
02 Sat by Pulse Oximetry 99 98
Oxygen Delivery Method Room Air Room Air



Lab Data

Lab Results

01/03/25 16:00: WBC 5.7, RBC 5.42, Hgb 16.7, Hct 49.3, MCV 91.0, MCH 30.8, MCHC 33.9, RDW 12.4, Plt Count 198, MPV 10.2, Neut % (Auto) 57.6, Lymph % (Auto) 32.3, Mono % (Auto) 7.2, Eos % (Auto) 1.6, Baso % (Auto) 1.1, Neut # (Auto) 3.3, Lymph # 
(Auto) 1.8, Mono # (Auto) 0.4, Eos # (Auto) 0.1, Baso # (Auto) 0.1, Sodium 138, Potassium 7.5 H*, Chloride 104, Carbon Dioxide 26, Anion Gap 15.5 H, BUN 8 L, Creatinine 0.80, Estimated Creat Clear 80, Estimated GFR 95, Est GFR (African Amer) 115, 
Glucose 102 H, Hemoglobin A1c 5.2, Calcium 9.7, Total Bilirubin 0.9, AST 28, ALT 22, Alkaline Phosphatase 67, Troponin I < 0.01, NT-Pro-B Natriuret Pep 112, Total Protein 7.2, Albumin 4.4, Globulin 2.8, Albumin/Globulin Ratio 1.6, Triglycerides 131, 
Cholesterol 229 H, LDL Cholesterol Direct 147.99 H, VLDL Cholesterol 26, HDL Cholesterol 46, Cholesterol/HDL Ratio 5.0 H, Lipase 57
01/03/25 16:30: Potassium 4.1  D




01/03/25 16:00          

01/03/25 16:30          

Orders (Tests/Meds): 

ED MEDICATIONS

Generic Name Dose Route Start Last Admin
  Trade Name Freq  PRN Reason Stop Dose Admin
Calcium Gluconate/Sodium Chloride  2 gm in 100 mls @ 50 mls/hr  01/03/25 16:29  01/03/25 17:25
  Calcium Gluconate 2,000mg/100ml Nacl Premix  IV  01/03/25 18:28  Not Given
  ONCE ONE  

Discontinued Medications

Generic Name Dose Route Start Last Admin
  Trade Name Freq  PRN Reason Stop Dose Admin
Aspirin  324 mg  01/03/25 15:50  01/03/25 16:30
  Aspirin 81mg Chewable Tablet  PO  01/03/25 15:51  324 mg
  ONCE ONE   Administration
Dextrose  50 ml  01/03/25 16:29  01/03/25 17:25
  Dextrose 50% 50ml Syringe (Crash Cart)  IVP  01/03/25 16:30  Not Given
  ONCE ONE  
Lactated Ringer's  1,000 mls @ 999 mls/hr  01/03/25 15:50  01/03/25 16:30
  Lactated Ringer's 1000 Ml Bag  IV  01/03/25 16:50  999 mls/hr
  .Q1H1M ONE   Administration
Insulin Human Regular  10 unit  01/03/25 16:29  01/03/25 17:25
  Insulin Human Regular 100 Units/Ml 10ml Vial  IV  01/03/25 16:30  Not Given
  ONCE ONE  
Iopamidol  80 ml  01/03/25 16:03  01/03/25 16:05
  Iopamidol-370 (76%);100ml Bottle  IV  01/03/25 16:04  80 ml
  ONCE ONE   Administration
Lisinopril  5 mg  01/03/25 17:17 
  Lisinopril 5mg Tablet  PO  01/03/25 17:18 
  ONCE ONE  
Sodium Chloride  10 ml  01/03/25 16:03  01/03/25 16:04
  Sodium Chloride 0.9% 10ml Syr (Rad Only)  IV  01/03/25 16:04  10 ml
  ONCE ONE   Administration
Sodium Chloride  50 ml  01/03/25 16:03  01/03/25 16:05
  0.9 % Sodium Chloride 50 Ml Vial  IV  01/03/25 16:04  50 ml
  ONCE ONE   Administration
Sodium Zirconium Cyclosilicate  10 gm  01/03/25 16:29  01/03/25 17:25
  Lokelma 5gm Packet  PO  01/03/25 16:30  Not Given
  ONCE ONE  

ORDERS

 Category Date Time Status
 CT angio head Stat Cat Scan  01/03/25 15:50 Completed
 CT angio neck Stat Cat Scan  01/03/25 15:50 Completed
 CT head/brain wo con Stat Cat Scan  01/03/25 15:50 Completed
 CXR --portable [XR chest portable] Stat Exams  01/03/25 15:50 Completed
 CBC w/Auto Diff [Complete Blood Count Auto Diff] Stat Lab  01/03/25 16:00 Completed
 CMP [Comprehensive Metabolic Panel] Stat Lab  01/03/25 16:00 Completed
 Hemoglobin A1C Stat Lab  01/03/25 16:00 Completed
 Lipase Stat Lab  01/03/25 16:00 Completed
 Lipid Panel Stat Lab  01/03/25 16:00 Completed
 NT Pro Brain Natriuretic Pep. Stat Lab  01/03/25 16:00 Completed
 Potassium Stat Lab  01/03/25 16:30 Completed
 Trop I [Troponin I] Stat Lab  01/03/25 16:00 Completed
 Troponin I Q3H Lab  01/03/25 19:00 Ordered
 Troponin I Q3H Lab  01/03/25 22:00 Ordered
 CA echo doppler complete AM Y  01/04/25 07:00 Ordered



Medical Decision Narrative: 
69-year-old male history of COPD and tobacco use disorder still currently smoking 1.5 packs/day and has been doing so for nearly 60 years presenting with right-sided weakness.  Patient states that he has intermittently had weakness on the right side 
over the past couple of months, but last night, 1/2 in the PM noticed that his right upper extremity was heavy and weak as well as his right lower extremity.  States that he was having difficulty picking his right lower extremity off the floor when 
he was walking and has fallen toward his right lower extremity a couple of times.  Has not hit his head.  No anticoagulation.  No chest pain, nausea, vomiting, left-sided deficits, speech deficits, vision changes, or any other concerns.  Never had 
anything like this in the past. History was obtained via conversation with patient. On arrival, patient hemodynamically stable, alert, oriented x4, appropriate, GCS 15, moving all extremities spontaneously, pupils equal and reactive to light.  Full 
physical exam performed and significant for Weakness and after right upper and right lower extremity with 4 out of 5 strength.  Dysmetria right upper extremity.  NIHSS 3.  Cardiopulmonary exam normal.  Cranial nerves intact and symmetric. 
Differential includes hemorrhagic CVA, embolic CVA, critical cervical or intracranial stenosis, intracranial mass, TIA, among others.  

Patient placed on continuous cardiac monitoring and continuous pulse ox with initial blood pressure 191/93, heart rate 59, saturation 99% on room air.  Independent interpretation of EKG shows sinus bradycardia 58 bpm.  , QRS 83, QTc 419.  No 
acute ischemic change.  Normal axis. Patient was given full dose aspirin 324 mg for symptomatic management and correction of underlying abnormalities.  Workup independently interpreted and significant for no acute intracranial hemorrhage on CT head. 
 Labs independently interpreted with nonactionable chemistry other than significantly elevated potassium greater than 7.  I feel this is likely a lab error given normal kidney function and otherwise normal labs.  Insulin, dextrose, calcium gluconate 
were ordered, but held prior to repeat potassium being drawn given no clinical symptoms and no EKG changes.  Repeat potassium 4.1.  Hyperkalemic protocol was canceled.  Nonactionable lipid panel.  CBC normal. On independent interpretation of 
angiograms, no acute intracranial hemorrhage.  Patient does have chronic findings in the deep matter of his brain.  See radiology read for full review of final results.  Heart score 3. On reevaluation, patient still symptomatic with NIHSS 3.  I feel 
this is likely representative of TIA versus basal ganglia stroke.  Patient's primary care provider team was contacted and case was discussed for admission.  Recommended lisinopril for pressures and admission.  Formal echo was also ordered for the 
morning.  MR head was also ordered.  Given patient presentation, workup, history, this most likely represents strokelike symptoms.  Because patient high risk for clinical decompensation, deemed appropriate for inpatient admission.  Results were 
relayed to patient who voiced understanding and patient was agreeable to inpatient admission and management.  Patient was admitted to the hospital for further definitive management.

Transcription disclaimer
Much of this encounter note is an electronic transcription spoken language to printed text.  Electronic transcription of the spoken language may permit errors.  Although I have reviewed the note, some errors may still exist.

Critical Care
Critical Care Time
Critical Care Time: No

## 2025-01-04 VITALS
TEMPERATURE: 97.8 F | OXYGEN SATURATION: 97 % | SYSTOLIC BLOOD PRESSURE: 153 MMHG | DIASTOLIC BLOOD PRESSURE: 73 MMHG | HEART RATE: 55 BPM | RESPIRATION RATE: 20 BRPM

## 2025-01-04 VITALS
TEMPERATURE: 97.88 F | SYSTOLIC BLOOD PRESSURE: 149 MMHG | HEART RATE: 52 BPM | OXYGEN SATURATION: 96 % | DIASTOLIC BLOOD PRESSURE: 73 MMHG | RESPIRATION RATE: 16 BRPM

## 2025-01-04 RX ADMIN — LISINOPRIL 5 MG: 5 TABLET ORAL at 10:51

## 2025-01-04 RX ADMIN — ACETAMINOPHEN 650 MG: 325 TABLET ORAL at 08:04

## 2025-01-04 RX ADMIN — ASPIRIN 81 MG: 81 TABLET, DELAYED RELEASE ORAL at 10:51

## 2025-01-04 RX ADMIN — CLOPIDOGREL BISULFATE 75 MG: 75 TABLET ORAL at 10:51

## 2025-01-04 RX ADMIN — ATORVASTATIN CALCIUM 80 MG: 40 TABLET, FILM COATED ORAL at 10:51

## 2025-01-04 NOTE — [facility].PTEV
Physical Therapy Evaluation

Rehab PT IP Evaluation                                     Start:  01/04/25 09:47
Freq:   ONCE                                               Status: Active        
Protocol:                                                                        
 Document     01/04/25 11:07  SHANNON  (Rec: 01/04/25 11:09  SHANNON  [id number])
 Subjective/History
     History
      History                                    Per H&P:  Mr. Billings is a 69
                                                 year old patient of [facility]
                                                 [facility], who presented
                                                 the [facility] ER yesterday with
                                                 acute worsening of right sided
                                                 weakness and difficulty
                                                 walking.  He had fallen a few
                                                 times at home as well but did
                                                 not hit his head or lose
                                                 consciousness.  Patient has a
                                                 history of hyperlipidemia and
                                                 has stopped taking his Lipitor
                                                 some time ago.  He also has
                                                 60 pack year smoking history,
                                                 and currently smokes 1.5 packs
                                                 per day. 
     Subjective
      Subjective                                 Pt lives in a 2 story home
                                                 with his wife. Pt usually IND
                                                 with all mobility without AD
                                                 use.
      New diagnosis of cancer in past 12         No
       months?                                   
 Rehab PT IP Eval
     Objective Appearance
      Patient Behavior                           Appropriate,Cooperative
      Patient Orientation                        Person,Situation
      Difficulty following instructions          none
      Speech Pattern                             Clear
     Ambulation
      Patient Able to Ambulate                   Yes
     Ambulation Observation
      IP General Gait Pattern Observation        Ataxic Gait
      Ambulation Distance (feet)                 30
      Ambulation Assistive Device                None
      Ambulation Ability                         Supervision/Stand by
     Balance
      Ability to Arise                           Able, uses arms to help
      Sitting Balance                            Steady, safe
      Standing Balance                           Steady, wide stance
      Dynamic Sitting Balance Ability            Good
      Dynamic Standing Balance Ability           Good
     Transfers
      Bed Transfer Ability                       Independent
      Sit to Stand Bed Transfer Ability          Independent
 Rehab PT IP prob,goals,plan
     Problems
      Date of Evaluation:                        01/04/25
     Rehab Potential
      Rehab Potential                            Innapropriate for Skilled
                                                 Therapy
     Discharge Plan
      PT Discharge Plan                          Pt demo'd decreased R knee
                                                 flexion during gait but with
                                                 no LOB or assistance required
                                                 for safety. Pt IND-SUP for
                                                 functional mobility without AD
                                                 use. Pt most appropriate to d
                                                 /c home with supervision as
                                                 needed provided by wife.
     Eval Complexity
      Eval Charge Codes                          90460 - Moderate Complexity



______________________________________________________________________
PHYSICIAN CERTIFICATION:

  I certify the specified therapy services for Leo Billings are required, authorized, 
and reviewed every 30 days.
______________________________________________________________________

## 2025-01-04 NOTE — P.HPDS_ITS
General    
Admission date::     
01/03/25    
    
Discharge date: 01/04/25    
    
*Admission Date: 01/03/25    
*Chief complaint: Right sided weakness    
*History of present illness:     
Mr. Billings is a 69 year old patient of [facility], who presented   
the [facility] ER yesterday with acute worsening of right sided weakness and difficulty  
walking.  He had fallen a few times at home as well but did not hit his head or   
lose consciousness.  Patient has a history of hyperlipidemia and has stopped   
taking his Lipitor some time ago.  He also has 60 pack year smoking history, and  
currently smokes 1.5 packs per day.    
    
[facility]    
Disclaimer:     
The information contained in this section may have been updated after the   
patient was seen, as this information can be updated by other users.    
    
Medical History (Updated 01/04/25 @ 10:00 by Erik Yost MD)    
    
IFG (impaired fasting glucose)    
Colon polyp    
Allergic rhinitis    
Erectile dysfunction    
Chronic sinusitis    
Anxiety    
Depression    
GERD (gastroesophageal reflux disease)    
Nicotine dependence, unspecified, uncomplicated    
Hyperlipidemia    
COPD (chronic obstructive pulmonary disease)    
    
    
Surgical History (Updated 01/04/25 @ 10:00 by Erik Yost MD)    
    
S/P colonoscopy    
S/P tonsillectomy    
    
    
Family History (Updated 01/04/25 @ 01:31 by Mary Beth Zhang RN)    
No significant family history    
    
    
Social History (Updated 01/04/25 @ 01:32 by Mary Beth Zhang RN)    
Smoking Status:  Current every day smoker tobacco type: cigarettes packs per   
day: 1     
alcohol intake:  current alcohol intake frequency: a few times a month     
substance use type:  marijuana     
current occupational status:  retired     
Travel in the last 8 weeks:  None     
caffeine:  Yes     
Have you lived/traveled outside [place] in past 30 days?:  No     
Contact w/someone who lives/traveled outside [place] past 30 days?:  No     
Exposure to someone with infectious disease in past 14 days?:  No     
Do you have a fever (greater than 100.4 F or 38 C)?:  No     
Have you tested positive for COVID-19:  No     
Exposed to someone with COVID-19 in past 14 days?:  No     
Do you have a sore throat?:  No     
Do you have a cough?:  No     
Do you have any weakness?:  No     
Do you have any diarrhea?:  No     
Are you experiencing any unusual bleeding?:  No     
Do you have any muscle aches/pain?:  No     
Do you have any abdominal pain?:  No     
Are you experiencing loss of taste or smell?:  No     
    
    
Other Medical History    
Have you received the Flu Vaccine for this season: Yes    
Have you received the Pneumonia Vaccine: Yes    
    
Review of Systems    
Constitutional    
Constitutional: Denies chills and Denies fever(s)    
*Cardiovascular    
Cardiovascular: Denies chest pain and Denies dyspnea    
*Respiratory    
Respiratory: Denies dyspnea    
*Gastrointestinal    
Gastrointestinal: Denies abdominal pain    
*Genitourinary    
Genitourinary: Denies difficulty urinating    
*Musculoskeletal    
Musculoskeletal: Denies arthralgias    
*Neurologic    
Neurologic: Reports as per HPI    
    
Exam    
Data for Last 24 hours    
Vital signs and Labs for Last 24 Hours:     
                                            
    
    
    
Temp Pulse Resp BP Pulse Ox O2 Del Method    
     
 97.8 F   55 L  20   153/73 H  97   Room Air    
     
 01/04/25 08:00  01/04/25 08:00  01/04/25 08:00  01/04/25 08:00  01/04/25 08:00   
01/04/25 08:00    
    
    
                         Laboratory Results - last 24 hr    
    
01/03/25 16:00: WBC 5.7, RBC 5.42, Hgb 16.7, Hct 49.3, MCV 91.0, MCH 30.8, MCHC   
33.9, RDW 12.4, Plt Count 198, MPV 10.2, Neut % (Auto) 57.6, Lymph % (Auto)   
32.3, Mono % (Auto) 7.2, Eos % (Auto) 1.6, Baso % (Auto) 1.1, Neut # (Auto) 3.3,  
Lymph # (Auto) 1.8, Mono # (Auto) 0.4, Eos # (Auto) 0.1, Baso # (Auto) 0.1,   
Sodium 138, Potassium 7.5 H*, Chloride 104, Carbon Dioxide 26, Anion Gap 15.5 H,  
BUN 8 L, Creatinine 0.80, Estimated Creat Clear 80, Estimated GFR 95, Est GFR   
(African Amer) 115, Glucose 102 H, Hemoglobin A1c 5.2, Calcium 9.7, Total   
Bilirubin 0.9, AST 28, ALT 22, Alkaline Phosphatase 67, Troponin I < 0.01,   
NT-Pro-B Natriuret Pep 112, Total Protein 7.2, Albumin 4.4, Globulin 2.8,   
Albumin/Globulin Ratio 1.6, Triglycerides 131, Cholesterol 229 H, LDL   
Cholesterol Direct 147.99 H, VLDL Cholesterol 26, HDL Cholesterol 46,   
Cholesterol/HDL Ratio 5.0 H, Lipase 57    
01/03/25 16:30: Potassium 4.1  D    
01/03/25 19:07: Troponin I < 0.01    
01/03/25 : Troponin I < 0.01    
    
    
I & O for Last 24 hours:     
                                 Intake & Output    
    
    
    
 01/01/25 01/02/25 01/03/25 01/04/25    
    
 23:59 23:59 23:59 23:59    
     
Intake Total   200 / 200 270 / 270    
     
Output Total   0 / 0 0 / 0    
     
Balance   200 / 200 270 / 270    
     
Weight   184 lb 175 lb 14.4 oz    
    
    
    
Constitutional    
Constitutional: no acute distress    
*Routine HEENT Exam    
Head: Present normocephalic    
Eye: Present EOMI and PERRL    
ENT: Present mucous membranes moist    
*Routine Neck Exam    
Neck: Present supple; Absent lymphadenopathy    
*Routine Respiratory Exam    
Respiratory: Present CTA bilaterally    
*Routine Cardiovascular Exam    
Cardiovascular: Present RRR    
*Routine Abdominal Exam    
Abdominal: Present soft and normoactive bowel sounds; Absent tenderness    
*Routine Rectal Exam    
Rectal:: deferred    
*Routine Genitalia Exam    
Genitalia:: deferred    
*Routine Extremities Exam    
Extremities: Absent cyanosis, clubbing or edema    
*Routine Skin Exam    
Skin: Present warm; Absent rash    
*Routine Neurological Exam    
Neurological: Present alert and oriented X3    
Comments:     
4/5 strength in right upper and lower extremity    
    
Meds    
Home Medications and Allergies    
                                Home Medications    
    
    
    
?Medication ?Instructions ?Recorded ?Confirmed ?Type    
     
albuterol sulfate 90 mcg/actuation 2 puffs IH DAILY COPD 04/30/19 01/03/25   
History    
    
aerosol inhaler        
     
fluoxetine 90 mg capsule,delayed 90 mg PO DAILY Depression 04/30/19 01/03/25   
History    
    
release        
     
fluticasone propionate 220 12 gm IH DAILY COPD 04/30/19 01/03/25 History    
    
mcg/actuation HFA aerosol inhaler        
     
aspirin 81 mg tablet,delayed 81 mg PO DAILY #30 tabs 01/04/25  Rx    
    
release (Adult Aspirin Regimen)        
     
atorvastatin 80 mg tablet (Lipitor) 80 mg PO DAILY #30 tabs 01/04/25  Rx    
     
clopidogrel 75 mg tablet (Plavix) 75 mg PO DAILY #30 tabs 01/04/25  Rx    
     
lisinopril 5 mg tablet 5 mg PO DAILY #30 tabs 01/04/25  Rx    
     
nicotine 21 mg/24 hr daily 1 patch transdermal DAILY #28 ea 01/04/25  Rx    
    
transdermal patch        
    
    
                           New Prescriptions to Start    
    
Prescriptions:  aspirin [Adult Aspirin Regimen]    
                  [place],Erik    
                atorvastatin [Lipitor]    
                  [place],Erik    
                clopidogrel [Plavix]    
                  [place],Erik    
                lisinopril    
                  [place],Erik    
                nicotine    
                  [place],Erik    
    
    
                                    Allergies    
    
    
    
Allergy/AdvReac Type Severity Reaction Status Date / Time    
     
Penicillins Allergy   Verified 11/03/21 14:58    
    
    
    
    
Hospital Course    
Hospital Course    
Hospital Course:     
He had an extensive evaluation in the ER which included an MRI that showed an   
acute left basal ganglia CVA.  He was admitted for further monitoring.  Physical  
 therapy evaluation was ordered.  Patient was given aspirin, plavix, lisinopril   
and high dose Lipitor.  Smoking cessation was discussed.  He was tolerating a   
regular diet and was anxious to go home.  Plan to defer echo to outpatient   
setting next week.    
    
Results    
Data Completed and Pending    
Labs on day of discharge:     
                             Labs from last 24 hours    
    
    
    
  01/03/25 01/03/25 01/03/25    
    
  Unknown 19:07 16:30    
     
WBC       
     
RBC       
     
Hgb       
     
Hct       
     
MCV       
     
MCH       
     
MCHC       
     
RDW       
     
Plt Count       
     
MPV       
     
Neut % (Auto)       
     
Lymph % (Auto)       
     
Mono % (Auto)       
     
Eos % (Auto)       
     
Baso % (Auto)       
     
Neut # (Auto)       
     
Lymph # (Auto)       
     
Mono # (Auto)       
     
Eos # (Auto)       
     
Baso # (Auto)       
     
Sodium       
     
Potassium    4.1  D    
     
Chloride       
     
Carbon Dioxide       
     
Anion Gap       
     
BUN       
     
Creatinine       
     
Estimated Creat Clear       
     
Estimated GFR       
     
Est GFR ( Amer)       
     
Glucose       
     
Hemoglobin A1c       
     
Calcium       
     
Total Bilirubin       
     
AST       
     
ALT       
     
Alkaline Phosphatase       
     
Troponin I  < 0.01  < 0.01     
     
NT-Pro-B Natriuret Pep       
     
Total Protein       
     
Albumin       
     
Globulin       
     
Albumin/Globulin Ratio       
     
Triglycerides       
     
Cholesterol       
     
LDL Cholesterol Direct       
     
VLDL Cholesterol       
     
HDL Cholesterol       
     
Cholesterol/HDL Ratio       
     
Lipase       
    
    
    
    
  01/03/25    
    
  16:00    
     
WBC  5.7    
     
RBC  5.42    
     
Hgb  16.7    
     
Hct  49.3    
     
MCV  91.0    
     
MCH  30.8    
     
MCHC  33.9    
     
RDW  12.4    
     
Plt Count  198    
     
MPV  10.2    
     
Neut % (Auto)  57.6    
     
Lymph % (Auto)  32.3    
     
Mono % (Auto)  7.2    
     
Eos % (Auto)  1.6    
     
Baso % (Auto)  1.1    
     
Neut # (Auto)  3.3    
     
Lymph # (Auto)  1.8    
     
Mono # (Auto)  0.4    
     
Eos # (Auto)  0.1    
     
Baso # (Auto)  0.1    
     
Sodium  138    
     
Potassium  7.5 H*    
     
Chloride  104    
     
Carbon Dioxide  26    
     
Anion Gap  15.5 H    
     
BUN  8 L    
     
Creatinine  0.80    
     
Estimated Creat Clear  80    
     
Estimated GFR  95    
     
Est GFR ( Amer)  115    
     
Glucose  102 H    
     
Hemoglobin A1c  5.2    
     
Calcium  9.7    
     
Total Bilirubin  0.9    
     
AST  28    
     
ALT  22    
     
Alkaline Phosphatase  67    
     
Troponin I  < 0.01    
     
NT-Pro-B Natriuret Pep  112    
     
Total Protein  7.2    
     
Albumin  4.4    
     
Globulin  2.8    
     
Albumin/Globulin Ratio  1.6    
     
Triglycerides  131    
     
Cholesterol  229 H    
     
LDL Cholesterol Direct  147.99 H    
     
VLDL Cholesterol  26    
     
HDL Cholesterol  46    
     
Cholesterol/HDL Ratio  5.0 H    
     
Lipase  57    
    
    
    
    
Imaging and Cardiology    
MRI - head:     
      Status: final report (Acute CVA)    
    
DS: Diagnosis    
Discharge Diagnosis    
(1) Acute stroke of basal ganglia:     
      Status: Acute    
      Code(s):    
I63.9 - Cerebral infarction, unspecified    
(2) COPD (chronic obstructive pulmonary disease):     
      Status: Acute    
      Code(s):    
J44.9 - Chronic obstructive pulmonary disease, unspecified    
(3) Acute right-sided muscle weakness:     
      Status: Acute    
      Code(s):    
M62.81 - Muscle weakness (generalized)    
(4) Hyperlipidemia:     
      Status: Acute    
      Code(s):    
E78.5 - Hyperlipidemia, unspecified    
(5) Nicotine dependence, unspecified, uncomplicated:     
      Status: Acute    
      Code(s):    
F17.200 - Nicotine dependence, unspecified, uncomplicated    
(6) GERD (gastroesophageal reflux disease):     
      Status: Acute    
      Code(s):    
K21.9 - Gastro-esophageal reflux disease without esophagitis    
    
Discharge Plan    
Disposition    
Patient Disposition: Home, Self-Care    
    
Condition: Fair    
    
Follow up Plan    
Follow up with:    
Erik Yost MD [Primary Care Provider] - 01/10/25 (please call for   
appointment)    
    
Prescriptions/Medication Reconciliation:    
New    
  aspirin [Adult Aspirin Regimen] 81 mg tablet,delayed release (DR/EC)     
   81 mg PO DAILY Qty: 30 0RF    
  clopidogrel [Plavix] 75 mg tablet     
   75 mg PO DAILY Qty: 30 0RF    
  lisinopril 5 mg tablet     
   5 mg PO DAILY Qty: 30 0RF    
  atorvastatin [Lipitor] 80 mg tablet     
   80 mg PO DAILY Qty: 30 0RF    
  nicotine 21 mg/24 hr patch 24 hour     
   1 patch transdermal DAILY Qty: 28 0RF    
    
Continued    
  fluoxetine 90 MG capsule,delayed release(DR/EC)     
   90 mg PO DAILY     
  fluticasone propionate 120 PUFFS HFA aerosol inhaler     
   12 gm IH DAILY     
  albuterol sulfate 8.5 GM HFA aerosol inhaler     
   2 puffs IH DAILY     
    
Discontinued    
  atorvastatin 10 MG tablet     
   10 mg PO HS     
    
Problem Reconciliation    
Problems Reviewed?: Yes    
    
Patient Discharge Instructions    
ACTIVITY: Limited activity    
    
DIET: low fat, low cholesterol and low salt diet    
    
Patient Instructions:  DI for Stroke-Ischemic, Nicotine Addiction, Support for   
Smokers Wanting to Quit, Reasons to Quit Smoking    
    
Print Language: English    
    
Providers    
Primary Care Provider: Erik Yost    
    
Admit Provider: Amarjit Piedra    
    
Attending Provider: Erik Yost

## 2025-01-04 NOTE — PC.NURSE
Pt. is alert and orientated x 4. c/o right sided weakness and a feeling of heaviness in the right arm and leg. Pt. able to raise right arm and leg. has full range o f motion the arm and leg. Hand grasp this morning are stronger than last night. Pt. 
states he feels like his leg is stronger this morning when getting to the bathroom. Pt. denies headache, pain, sob, or chest pain. Pt answered any questions asked appropriatly. VSS. personal items and call bell in reach.

## 2025-01-04 NOTE — EXP.HPDC
General
Admission date:: 
01/03/25

Discharge date: 01/04/25

*Admission Date: 01/03/25
*Chief complaint: Right sided weakness
*History of present illness: 
Mr. Billings is a 69 year old patient of [facility], who presented the [facility] ER yesterday with acute worsening of right sided weakness and difficulty walking.  He had fallen a few times at home as well but did not hit his head or lose 
consciousness.  Patient has a history of hyperlipidemia and has stopped taking his Lipitor some time ago.  He also has 60 pack year smoking history, and currently smokes 1.5 packs per day.

[facility]
Disclaimer: 
The information contained in this section may have been updated after the patient was seen, as this information can be updated by other users.

Medical History (Updated 01/04/25 @ 10:00 by Erik Yost MD)

IFG (impaired fasting glucose)
Colon polyp
Allergic rhinitis
Erectile dysfunction
Chronic sinusitis
Anxiety
Depression
GERD (gastroesophageal reflux disease)
Nicotine dependence, unspecified, uncomplicated
Hyperlipidemia
COPD (chronic obstructive pulmonary disease)


Surgical History (Updated 01/04/25 @ 10:00 by Erik Yost MD)

S/P colonoscopy
S/P tonsillectomy


Family History (Updated 01/04/25 @ 01:31 by Mary Beth Zhang RN)
No significant family history


Social History (Updated 01/04/25 @ 01:32 by Mary Beth Zhang RN)
Smoking Status:  Current every day smoker tobacco type: cigarettes packs per day: 1 
alcohol intake:  current alcohol intake frequency: a few times a month 
substance use type:  marijuana 
current occupational status:  retired 
Travel in the last 8 weeks:  None 
caffeine:  Yes 
Have you lived/traveled outside [place] in past 30 days?:  No 
Contact w/someone who lives/traveled outside [place] past 30 days?:  No 
Exposure to someone with infectious disease in past 14 days?:  No 
Do you have a fever (greater than 100.4 F or 38 C)?:  No 
Have you tested positive for COVID-19:  No 
Exposed to someone with COVID-19 in past 14 days?:  No 
Do you have a sore throat?:  No 
Do you have a cough?:  No 
Do you have any weakness?:  No 
Do you have any diarrhea?:  No 
Are you experiencing any unusual bleeding?:  No 
Do you have any muscle aches/pain?:  No 
Do you have any abdominal pain?:  No 
Are you experiencing loss of taste or smell?:  No 


Other Medical History
Have you received the Flu Vaccine for this season: Yes
Have you received the Pneumonia Vaccine: Yes

Review of Systems
Constitutional
Constitutional: Denies chills and Denies fever(s)
*Cardiovascular
Cardiovascular: Denies chest pain and Denies dyspnea
*Respiratory
Respiratory: Denies dyspnea
*Gastrointestinal
Gastrointestinal: Denies abdominal pain
*Genitourinary
Genitourinary: Denies difficulty urinating
*Musculoskeletal
Musculoskeletal: Denies arthralgias
*Neurologic
Neurologic: Reports as per HPI

Exam
Data for Last 24 hours
Vital signs and Labs for Last 24 Hours: 


Temp Pulse Resp BP Pulse Ox O2 Del Method
 97.8 F   55 L  20   153/73 H  97   Room Air
 01/04/25 08:00  01/04/25 08:00  01/04/25 08:00  01/04/25 08:00  01/04/25 08:00  01/04/25 08:00

Laboratory Results - last 24 hr

01/03/25 16:00: WBC 5.7, RBC 5.42, Hgb 16.7, Hct 49.3, MCV 91.0, MCH 30.8, MCHC 33.9, RDW 12.4, Plt Count 198, MPV 10.2, Neut % (Auto) 57.6, Lymph % (Auto) 32.3, Mono % (Auto) 7.2, Eos % (Auto) 1.6, Baso % (Auto) 1.1, Neut # (Auto) 3.3, Lymph # 
(Auto) 1.8, Mono # (Auto) 0.4, Eos # (Auto) 0.1, Baso # (Auto) 0.1, Sodium 138, Potassium 7.5 H*, Chloride 104, Carbon Dioxide 26, Anion Gap 15.5 H, BUN 8 L, Creatinine 0.80, Estimated Creat Clear 80, Estimated GFR 95, Est GFR (African Amer) 115, 
Glucose 102 H, Hemoglobin A1c 5.2, Calcium 9.7, Total Bilirubin 0.9, AST 28, ALT 22, Alkaline Phosphatase 67, Troponin I < 0.01, NT-Pro-B Natriuret Pep 112, Total Protein 7.2, Albumin 4.4, Globulin 2.8, Albumin/Globulin Ratio 1.6, Triglycerides 131, 
Cholesterol 229 H, LDL Cholesterol Direct 147.99 H, VLDL Cholesterol 26, HDL Cholesterol 46, Cholesterol/HDL Ratio 5.0 H, Lipase 57
01/03/25 16:30: Potassium 4.1  D
01/03/25 19:07: Troponin I < 0.01
01/03/25 : Troponin I < 0.01


I & O for Last 24 hours: 
Intake & Output

 01/01/25 01/02/25 01/03/25 01/04/25
 23:59 23:59 23:59 23:59
Intake Total   200 / 200 270 / 270
Output Total   0 / 0 0 / 0
Balance   200 / 200 270 / 270
Weight   184 lb 175 lb 14.4 oz


Constitutional
Constitutional: no acute distress
*Routine HEENT Exam
Head: Present normocephalic
Eye: Present EOMI and PERRL
ENT: Present mucous membranes moist
*Routine Neck Exam
Neck: Present supple; Absent lymphadenopathy
*Routine Respiratory Exam
Respiratory: Present CTA bilaterally
*Routine Cardiovascular Exam
Cardiovascular: Present RRR
*Routine Abdominal Exam
Abdominal: Present soft and normoactive bowel sounds; Absent tenderness
*Routine Rectal Exam
Rectal:: deferred
*Routine Genitalia Exam
Genitalia:: deferred
*Routine Extremities Exam
Extremities: Absent cyanosis, clubbing or edema
*Routine Skin Exam
Skin: Present warm; Absent rash
*Routine Neurological Exam
Neurological: Present alert and oriented X3
Comments: 
4/5 strength in right upper and lower extremity

Meds
Home Medications and Allergies
Home Medications

?Medication ?Instructions ?Recorded ?Confirmed ?Type
albuterol sulfate 90 mcg/actuation 2 puffs IH DAILY COPD 04/30/19 01/03/25 History
aerosol inhaler    
fluoxetine 90 mg capsule,delayed 90 mg PO DAILY Depression 04/30/19 01/03/25 History
release    
fluticasone propionate 220 12 gm IH DAILY COPD 04/30/19 01/03/25 History
mcg/actuation HFA aerosol inhaler    
aspirin 81 mg tablet,delayed 81 mg PO DAILY #30 tabs 01/04/25  Rx
release (Adult Aspirin Regimen)    
atorvastatin 80 mg tablet (Lipitor) 80 mg PO DAILY #30 tabs 01/04/25  Rx
clopidogrel 75 mg tablet (Plavix) 75 mg PO DAILY #30 tabs 01/04/25  Rx
lisinopril 5 mg tablet 5 mg PO DAILY #30 tabs 01/04/25  Rx
nicotine 21 mg/24 hr daily 1 patch transdermal DAILY #28 ea 01/04/25  Rx
transdermal patch    

New Prescriptions to Start

Prescriptions:  aspirin [Adult Aspirin Regimen]
                  [place],Erik
                atorvastatin [Lipitor]
                  [place],Erik
                clopidogrel [Plavix]
                  [place],Erik
                lisinopril
                  [place],Erik
                nicotine
                  [place],Erik


Allergies

Allergy/AdvReac Type Severity Reaction Status Date / Time
Penicillins Allergy   Verified 11/03/21 14:58



Hospital Course
Hospital Course
Hospital Course: 
He had an extensive evaluation in the ER which included an MRI that showed an acute left basal ganglia CVA.  He was admitted for further monitoring.  Physical therapy evaluation was ordered.  Patient was given aspirin, plavix, lisinopril and high 
dose Lipitor.  Smoking cessation was discussed.  He was tolerating a regular diet and was anxious to go home.  Plan to defer echo to outpatient setting next week.

Results
Data Completed and Pending
Labs on day of discharge: 
Labs from last 24 hours

  01/03/25 01/03/25 01/03/25
  Unknown 19:07 16:30
WBC   
RBC   
Hgb   
Hct   
MCV   
MCH   
MCHC   
RDW   
Plt Count   
MPV   
Neut % (Auto)   
Lymph % (Auto)   
Mono % (Auto)   
Eos % (Auto)   
Baso % (Auto)   
Neut # (Auto)   
Lymph # (Auto)   
Mono # (Auto)   
Eos # (Auto)   
Baso # (Auto)   
Sodium   
Potassium    4.1  D
Chloride   
Carbon Dioxide   
Anion Gap   
BUN   
Creatinine   
Estimated Creat Clear   
Estimated GFR   
Est GFR (African Amer)   
Glucose   
Hemoglobin A1c   
Calcium   
Total Bilirubin   
AST   
ALT   
Alkaline Phosphatase   
Troponin I  < 0.01  < 0.01 
NT-Pro-B Natriuret Pep   
Total Protein   
Albumin   
Globulin   
Albumin/Globulin Ratio   
Triglycerides   
Cholesterol   
LDL Cholesterol Direct   
VLDL Cholesterol   
HDL Cholesterol   
Cholesterol/HDL Ratio   
Lipase   

  01/03/25
  16:00
WBC  5.7
RBC  5.42
Hgb  16.7
Hct  49.3
MCV  91.0
MCH  30.8
MCHC  33.9
RDW  12.4
Plt Count  198
MPV  10.2
Neut % (Auto)  57.6
Lymph % (Auto)  32.3
Mono % (Auto)  7.2
Eos % (Auto)  1.6
Baso % (Auto)  1.1
Neut # (Auto)  3.3
Lymph # (Auto)  1.8
Mono # (Auto)  0.4
Eos # (Auto)  0.1
Baso # (Auto)  0.1
Sodium  138
Potassium  7.5 H*
Chloride  104
Carbon Dioxide  26
Anion Gap  15.5 H
BUN  8 L
Creatinine  0.80
Estimated Creat Clear  80
Estimated GFR  95
Est GFR (African Amer)  115
Glucose  102 H
Hemoglobin A1c  5.2
Calcium  9.7
Total Bilirubin  0.9
AST  28
ALT  22
Alkaline Phosphatase  67
Troponin I  < 0.01
NT-Pro-B Natriuret Pep  112
Total Protein  7.2
Albumin  4.4
Globulin  2.8
Albumin/Globulin Ratio  1.6
Triglycerides  131
Cholesterol  229 H
LDL Cholesterol Direct  147.99 H
VLDL Cholesterol  26
HDL Cholesterol  46
Cholesterol/HDL Ratio  5.0 H
Lipase  57



Imaging and Cardiology
MRI - head: 
      Status: final report (Acute CVA)

DS: Diagnosis
Discharge Diagnosis
(1) Acute stroke of basal ganglia: 
      Status: Acute
      Code(s):
I63.9 - Cerebral infarction, unspecified
(2) COPD (chronic obstructive pulmonary disease): 
      Status: Acute
      Code(s):
J44.9 - Chronic obstructive pulmonary disease, unspecified
(3) Acute right-sided muscle weakness: 
      Status: Acute
      Code(s):
M62.81 - Muscle weakness (generalized)
(4) Hyperlipidemia: 
      Status: Acute
      Code(s):
E78.5 - Hyperlipidemia, unspecified
(5) Nicotine dependence, unspecified, uncomplicated: 
      Status: Acute
      Code(s):
F17.200 - Nicotine dependence, unspecified, uncomplicated
(6) GERD (gastroesophageal reflux disease): 
      Status: Acute
      Code(s):
K21.9 - Gastro-esophageal reflux disease without esophagitis

Discharge Plan
Disposition
Patient Disposition: Home, Self-Care

Condition: Fair

Follow up Plan
Follow up with:
Erik Yost MD [Primary Care Provider] - 01/10/25 (please call for appointment)

Prescriptions/Medication Reconciliation:
New
  aspirin [Adult Aspirin Regimen] 81 mg tablet,delayed release (DR/EC) 
   81 mg PO DAILY Qty: 30 0RF
  clopidogrel [Plavix] 75 mg tablet 
   75 mg PO DAILY Qty: 30 0RF
  lisinopril 5 mg tablet 
   5 mg PO DAILY Qty: 30 0RF
  atorvastatin [Lipitor] 80 mg tablet 
   80 mg PO DAILY Qty: 30 0RF
  nicotine 21 mg/24 hr patch 24 hour 
   1 patch transdermal DAILY Qty: 28 0RF

Continued
  fluoxetine 90 MG capsule,delayed release(DR/EC) 
   90 mg PO DAILY 
  fluticasone propionate 120 PUFFS HFA aerosol inhaler 
   12 gm IH DAILY 
  albuterol sulfate 8.5 GM HFA aerosol inhaler 
   2 puffs IH DAILY 

Discontinued
  atorvastatin 10 MG tablet 
   10 mg PO HS 

Problem Reconciliation
Problems Reviewed?: Yes

Patient Discharge Instructions
ACTIVITY: Limited activity

DIET: low fat, low cholesterol and low salt diet

Patient Instructions:  DI for Stroke-Ischemic, Nicotine Addiction, Support for Smokers Wanting to Quit, Reasons to Quit Smoking

Print Language: English

Providers
Primary Care Provider: Erik Yost

Admit Provider: Amarjit Piedra

Attending Provider: Erik Yost